# Patient Record
Sex: FEMALE | Race: WHITE | URBAN - METROPOLITAN AREA
[De-identification: names, ages, dates, MRNs, and addresses within clinical notes are randomized per-mention and may not be internally consistent; named-entity substitution may affect disease eponyms.]

---

## 2017-02-16 ENCOUNTER — HISTORICAL (OUTPATIENT)
Dept: ADMINISTRATIVE | Facility: HOSPITAL | Age: 82
End: 2017-02-16

## 2017-03-17 ENCOUNTER — HISTORICAL (OUTPATIENT)
Dept: RADIOLOGY | Facility: HOSPITAL | Age: 82
End: 2017-03-17

## 2017-03-30 ENCOUNTER — HISTORICAL (OUTPATIENT)
Dept: ADMINISTRATIVE | Facility: HOSPITAL | Age: 82
End: 2017-03-30

## 2017-06-29 ENCOUNTER — HISTORICAL (OUTPATIENT)
Dept: ADMINISTRATIVE | Facility: HOSPITAL | Age: 82
End: 2017-06-29

## 2022-04-10 ENCOUNTER — HISTORICAL (OUTPATIENT)
Dept: ADMINISTRATIVE | Facility: HOSPITAL | Age: 87
End: 2022-04-10

## 2022-04-24 VITALS
WEIGHT: 149.94 LBS | HEIGHT: 62 IN | BODY MASS INDEX: 27.59 KG/M2 | DIASTOLIC BLOOD PRESSURE: 61 MMHG | SYSTOLIC BLOOD PRESSURE: 150 MMHG

## 2022-05-03 NOTE — HISTORICAL [facility] CERNER
This is a historical note converted from Efe. Formatting and pictures may have been removed.  Please reference Efe for original formatting and attached multimedia. Chief Complaint  6 MONTH FOLLOW UP RIGHT HIP IMN, AMBULATING WITH CANE. NO COMPLAINTS  History of Present Illness  Patient is now 6 months s/p right hip IM nail. She is doing much better. Ambulating with cane. No pain. Mild limp at times especially toward the end of the day. Very happy with her progress.  Review of Systems  ????Constitutional: Negative.  ????Eye: Negative.  ????Ear/Nose/Mouth/Throat: Negative.  ????Respiratory: Negative.  ????Cardiovascular: Negative.  ????Gastrointestinal: Negative.  ????Genitourinary: Negative.  ????Hematology/Lymphatics: Negative.  ????Endocrine: Negative.  ????Immunologic: Negative.  ????Musculoskeletal: Negative except HPI.  ????Integumentary: Negative.  ????Neurologic: Negative.  ????Psychiatric: Negative.  ????All other systems are negative  Physical Exam  Vitals & Measurements  HR:?71?(Peripheral)? BP:?150/61? HT:?158?cm? HT:?158?cm? WT:?68?kg? WT:?68?kg? BMI:?27.24?  ????General-Alert, oriented, no fever, chills  ????HEENT-Normocephalic, EOMI, moist oral mucosa, neck supple and nontender  ????Respiratory-Nonlabored respirations, symmetric chest expansion, chest wall nontender  ????Cardiac-Regular rate and rhythm, Pulses palpable in all extremities, Normal peripheral perfusion  ????Gastrointestinal-Soft, nontender, nondistended  ????Hemo/Lymph-No lymphadenopathy  ????Musculoskeletal-RLE-FROM right hip, nontender. Steady gait. NVID.  ????Neurologic-Alert and oriented x4, cooperative  ????Dermatologic-Skin warm, pink, dry.  Assessment/Plan  Closed intertrochanteric fracture of right femur with routine healing  ?  Doing very well. She can continue FWBAT to RLE. Full activity as tolerated. She can follow up on an as needed basis, for any new or worsening orthopedic problems. Limp should subside over time as  muscle strength continues to slowly improve with more activity.  ?  Ordered:  Office/Outpatient Visit Level 3 Established [id number] PC, Closed intertrochanteric fracture of right femur with routine healing, [facility], 06/29/17 10:00:00 CDT  ?  Orders:  Clinic Follow-up PRN, 06/29/17 10:00:00 CDT, Future Order, [facility]   Problem List/Past Medical History  CKD (chronic kidney disease) stage 2, GFR 60-89 ml/min  Iron deficiency anemia, unspecified  Multiple gastric ulcers  Historical  Cataract  Indigestion  Loss of appetite  Procedure/Surgical History  Transfusion of Nonautologous Red Blood Cells into Peripheral Vein, Percutaneous Approach (12/30/2016), Gamma Nail Insertion (Right) (12/29/2016), Reposition Right Lower Femur with Intramedullary Internal Fixation Device, Percutaneous Approach (12/29/2016), Biopsy Gastrointestional (09/07/2015), Esophagogastroduodenoscopy (09/07/2015), Esophagogastroduodenoscopy [EGD] with closed biopsy (09/07/2015), Discission of secondary membrane [after cataract] (05/19/2014), Discission of secondary membranous cataract (opacified posterior lens capsule and/or anterior hyaloid); laser surgery (eg, YAG laser) (1 or more stages). (05/19/2014), Cataract surgery, Hysterectomy.  Medications  amlodipine 5 mg oral tablet, 5 mg, 1 tab(s), Oral, Daily  ascorbic acid 500 mg oral tablet, 500 mg, 1 tab(s), Oral, At Bedtime  benazepril 10 mg oral tablet, 10 mg, 1 tab(s), Oral, Daily  Centrum oral tablet, 1 tab(s), Oral, Daily  Ecotrin 325 mg oral enteric coated tablet, 325 mg, 1 tab(s), Oral, Daily,? ?Not taking  Fergon 240 mg oral tablet, 240 mg, 1 tab(s), Oral, Daily  Lastacaft, 1 drop(s), Eye-Both, Daily  Latanoprost 0.005% Eye Drops, 1 drop(s), Eye-Both, qPM  latanoprost-timolol ophthalmic  Nexium 40 mg oral delayed release cap (MC Substitution), See Instructions, 5 refills  Allergies  No Known Allergies  Social History  Alcohol  Never  Substance Abuse  Never  Tobacco -  Denies Tobacco Use  Never smoker  Family History  Tobacco use: Father.  Diagnostic Results  X-ray right hip, pelvis shows anatomic alignment, intact hardware, fracture completely healed.      Patient evaluated and discussed with Christopher Rojas NP. I agree with his assessment and plan of care with any exceptions or additions noted. Doing great, ambulating with a cane.?Follow-up on an as an as-needed basis should any new pain or problems arise.

## 2024-06-14 ENCOUNTER — HOSPITAL ENCOUNTER (OUTPATIENT)
Dept: RADIOLOGY | Facility: HOSPITAL | Age: 89
Discharge: HOME | End: 2024-06-14
Attending: NURSE PRACTITIONER
Payer: MEDICARE

## 2024-06-14 DIAGNOSIS — K59.00 ACUTE CONSTIPATION: ICD-10-CM

## 2024-06-14 DIAGNOSIS — K59.00 ACUTE CONSTIPATION: Primary | ICD-10-CM

## 2024-06-14 PROCEDURE — 74019 RADEX ABDOMEN 2 VIEWS: CPT | Mod: TC
